# Patient Record
Sex: FEMALE | Race: BLACK OR AFRICAN AMERICAN | URBAN - METROPOLITAN AREA
[De-identification: names, ages, dates, MRNs, and addresses within clinical notes are randomized per-mention and may not be internally consistent; named-entity substitution may affect disease eponyms.]

---

## 2023-11-01 ENCOUNTER — IMMUNIZATION (OUTPATIENT)
Age: 48
End: 2023-11-01

## 2023-11-16 ENCOUNTER — HOSPITAL ENCOUNTER (EMERGENCY)
Facility: HOSPITAL | Age: 48
Discharge: HOME | End: 2023-11-16
Payer: MEDICAID

## 2023-11-16 VITALS
OXYGEN SATURATION: 100 % | SYSTOLIC BLOOD PRESSURE: 113 MMHG | RESPIRATION RATE: 16 BRPM | BODY MASS INDEX: 28.95 KG/M2 | HEART RATE: 89 BPM | DIASTOLIC BLOOD PRESSURE: 72 MMHG | TEMPERATURE: 99 F | HEIGHT: 63 IN | WEIGHT: 163.36 LBS

## 2023-11-16 DIAGNOSIS — N30.01 ACUTE CYSTITIS WITH HEMATURIA: Primary | ICD-10-CM

## 2023-11-16 LAB
APPEARANCE UR: ABNORMAL
BACTERIA URNS QL MICRO: ABNORMAL /HPF
BILIRUB UR QL CFM: NEGATIVE
COLOR UR: ABNORMAL
EPITH CASTS URNS QL MICRO: ABNORMAL /LPF
GLUCOSE UR STRIP.AUTO-MCNC: NEGATIVE MG/DL
HCG UR QL: NEGATIVE
HGB UR QL STRIP: ABNORMAL
KETONES UR QL STRIP.AUTO: ABNORMAL MG/DL
LEUKOCYTE ESTERASE UR QL STRIP.AUTO: ABNORMAL
NITRITE UR QL STRIP.AUTO: POSITIVE
PH UR STRIP: 6 (ref 5–8)
PROT UR STRIP-MCNC: 100 MG/DL
RBC #/AREA URNS HPF: ABNORMAL /HPF (ref 0–5)
SP GR UR REFRACTOMETRY: 1.02
URINE CULTURE IF INDICATED: ABNORMAL
UROBILINOGEN UR QL STRIP.AUTO: 1 EU/DL (ref 0.2–1)
WBC URNS QL MICRO: ABNORMAL /HPF (ref 0–4)

## 2023-11-16 PROCEDURE — 87086 URINE CULTURE/COLONY COUNT: CPT

## 2023-11-16 PROCEDURE — 81025 URINE PREGNANCY TEST: CPT

## 2023-11-16 PROCEDURE — 87077 CULTURE AEROBIC IDENTIFY: CPT

## 2023-11-16 PROCEDURE — 99283 EMERGENCY DEPT VISIT LOW MDM: CPT

## 2023-11-16 PROCEDURE — 81001 URINALYSIS AUTO W/SCOPE: CPT

## 2023-11-16 RX ORDER — CEPHALEXIN 500 MG/1
500 CAPSULE ORAL 4 TIMES DAILY
Qty: 28 CAPSULE | Refills: 0 | Status: SHIPPED | OUTPATIENT
Start: 2023-11-16 | End: 2023-11-23

## 2023-11-16 ASSESSMENT — PAIN - FUNCTIONAL ASSESSMENT: PAIN_FUNCTIONAL_ASSESSMENT: 0-10

## 2023-11-16 ASSESSMENT — PAIN SCALES - GENERAL
PAINLEVEL_OUTOF10: 8
PAINLEVEL_OUTOF10: 0

## 2023-11-16 NOTE — ED NOTES
Pt given discharge papers. One E prescriptions for cephALEXin sent to patients pharmacy. Pt educated on discharge papers and prescriptions. Pt instructed to follow up with PCP. Pt verbalizes understanding and denies any further questions. Pt ambulated to waiting room with steady gait, alert and oriented x4, VSS - SPO2- 100%; Pulse 89; BP - 113/72. Pt states that she will take OTC medications for pain when she gets home.       Evangelist Kent RN  11/16/23 1129       Tabitha Jones RN  11/16/23 1130       Tabitha Jones RN  11/16/23 1130

## 2023-11-16 NOTE — ED TRIAGE NOTES
Patient comes to ED thinking she may have UTI. Also reports nausea and vomiting and thinks she may be pregnant. Denies any diarrhea because she is constipated.

## 2023-11-16 NOTE — ED PROVIDER NOTES
[facility] EMERGENCY DEPT  EMERGENCY DEPARTMENT HISTORY AND PHYSICAL EXAM      Date: 11/16/2023  Patient Name: Nohemy Bustamante  MRN: [id number]  YOB: 1975  Date of evaluation: 11/16/2023  Provider: SCOTT Sheth NP   Note Started: 10:37 AM EST 11/16/23    HISTORY OF PRESENT ILLNESS     Chief Complaint   Patient presents with    Urinary Tract Infection    Emesis       History Provided By: Patient    HPI: Nohemy Bustamante is a 57 y.o. female with no significant past medical history presents to the emergency room with cc of dysuria and pregnancy concern. Patient reports 1 week history of urine frequency and pressure. She denies any fever/chills, abdominal pain, flank pain or hematuria. She denies any abnormal vaginal discharge or STI concerns. She states she is worried she may be pregnant because she has been nauseated over the last several days. She is unsure of her last menstrual cycle but thinks she had 1 sometime last month. She did take a home pregnancy test several weeks ago which was negative. States she has not eaten much but has been drinking some fluids and eating noodles. She denies any diarrhea or known sick contacts. LMP: unknown    PAST MEDICAL HISTORY   Past Medical History:  No past medical history on file. Past Surgical History:  No past surgical history on file. Family History:  No family history on file. Social History: Allergies:  No Known Allergies    PCP: No primary care provider on file. Current Meds:   No current facility-administered medications for this encounter.      Current Outpatient Medications   Medication Sig Dispense Refill    cephALEXin (KEFLEX) 500 MG capsule Take 1 capsule by mouth 4 times daily for 7 days 28 capsule 0       Social Determinants of Health:   Social Determinants of Health     Tobacco Use: Not on file   Alcohol Use: Not on file   Financial Resource Strain: Not on file   Food Insecurity: Not on file   Transportation Needs: Not

## 2023-11-16 NOTE — DISCHARGE INSTRUCTIONS
Thank you! Thank you for allowing me to care for you in the emergency department. It is my goal to provide you with excellent care. If you have not received excellent quality care, please ask to speak to the nurse manager. Please fill out the survey that will come to you by mail or email since we listen to your feedback! Below you will find a list of your tests from today's visit. Should you have any questions, please do not hesitate to call the emergency department. Labs  Recent Results (from the past 12 hour(s))   Urinalysis with Reflex to Culture    Collection Time: 11/16/23 10:28 AM    Specimen: Miscellaneous sample    Urine specimen   Result Value Ref Range    Color, UA ORANGE      Appearance TURBID (A) CLEAR      Specific Gravity, UA 1.025      pH, Urine 6.0 5.0 - 8.0      Protein,  (A) NEG mg/dL    Glucose, UA Negative NEG mg/dL    Ketones, Urine TRACE (A) NEG mg/dL    Blood, Urine MODERATE (A) NEG      Urobilinogen, Urine 1.0 0.2 - 1.0 EU/dL    Nitrite, Urine Positive (A) NEG      Leukocyte Esterase, Urine MODERATE (A) NEG      WBC, UA 20-50 0 - 4 /hpf    RBC, UA 5-10 0 - 5 /hpf    Epithelial Cells UA FEW FEW /lpf    BACTERIA, URINE 1+ (A) NEG /hpf    Urine Culture if Indicated URINE CULTURE ORDERED (A) CNI     Bilirubin, Confirmatory    Collection Time: 11/16/23 10:28 AM   Result Value Ref Range    Bilirubin Confirmation, UA Negative NEG     POC Pregnancy Urine Qual    Collection Time: 11/16/23 10:30 AM   Result Value Ref Range    Preg Test, Ur Negative NEG         Radiologic Studies  No orders to display     ------------------------------------------------------------------------------------------------------------  The exam and treatment you received in the Emergency Department were for an urgent problem and are not intended as complete care.  It is important that you follow-up with a doctor, nurse practitioner, or physician assistant to:  (1) confirm your diagnosis,  (2) re-evaluation

## 2023-11-17 LAB
BACTERIA SPEC CULT: ABNORMAL
CC UR VC: ABNORMAL
SERVICE CMNT-IMP: ABNORMAL